# Patient Record
Sex: MALE | Race: WHITE
[De-identification: names, ages, dates, MRNs, and addresses within clinical notes are randomized per-mention and may not be internally consistent; named-entity substitution may affect disease eponyms.]

---

## 2017-02-08 ENCOUNTER — HOSPITAL ENCOUNTER (OUTPATIENT)
Dept: HOSPITAL 80 - CIMAGING | Age: 47
End: 2017-02-08
Attending: FAMILY MEDICINE
Payer: COMMERCIAL

## 2017-02-08 DIAGNOSIS — M16.0: Primary | ICD-10-CM

## 2017-02-08 NOTE — DX
Bilateral hips, 3 views each side.



History: Bilateral hip pain.



Comparison Study: March 24, 2010.



Findings: Progressive left hip joint space narrowing, especially medially, compatible with moderate o
steoarthritis. Right hip joint is stable in appearance with mild narrowing medially.



Sacroiliac joints and symphysis pubis appear unremarkable.



Impression:

1. Progressive left hip osteoarthritis, moderate severity. Stable right hip osteoarthritis, mild.

## 2017-12-18 ENCOUNTER — HOSPITAL ENCOUNTER (OUTPATIENT)
Dept: HOSPITAL 80 - FIMAGING | Age: 47
End: 2017-12-18
Attending: FAMILY MEDICINE
Payer: COMMERCIAL

## 2017-12-18 DIAGNOSIS — M25.851: Primary | ICD-10-CM

## 2017-12-18 DIAGNOSIS — M25.852: ICD-10-CM

## 2019-06-13 ENCOUNTER — HOSPITAL ENCOUNTER (OUTPATIENT)
Dept: HOSPITAL 80 - FIMAGING | Age: 49
End: 2019-06-13
Payer: COMMERCIAL